# Patient Record
(demographics unavailable — no encounter records)

---

## 2019-06-27 NOTE — ULT
ULTRASOUND PELVIC

DOPPLER DUPLEX:



DATE:

6/27/2019



HISTORY:

18-year-old female with pelvic pain



TECHNIQUE:

Transabdominal transducer used to visualize intrapelvic contents with grayscale, color-flow, and spec
tral analysis.



FINDINGS:

Uterus is 5 x 2 x 3.5 cm.

Endometrial stripe 0.3 cm (3 mm)

Right ovary 1.5 x 2.5 x 2 cm.

Left ovary 2 x 2 x 1.5 cm.

No ovarian cyst.

Blood flow demonstrated in both ovaries by Doppler.

No free fluid in the cul-de-sac.



IMPRESSION:

Negative



Reported By: Juanito Murdock 

Electronically Signed:  6/27/2019 4:50 PM

## 2019-06-27 NOTE — CT
CT OF THE ABDOMEN AND PELVIS WITH CONTRAST:

6/27/19

 

COMPARISON: 

None.

 

HISTORY: 

Abdominal pain that began on Friday. The pain is most severe in the right upper quadrant of the abdom
en but has migrated to the middle and left side of the abdomen. 

 

TECHNIQUE:  

Multiple contiguous axial images were obtained in a CT of the abdomen and pelvis with contrast. PO co
ntrast was administered. Coronal reformats were performed.

 

FINDINGS: 

The liver, gallbladder, kidneys, adrenal glands, spleen, and pancreas are unremarkable.  No free air 
or stranding changes are seen in the abdomen or pelvis.  A small amount of free fluid in the pelvis i
s likely physiologic. 

 

The reproductive organs are unremarkable. The large and small bowel are unremarkable.  The appendix i
s normal. No abdominal or pelvic lymphadenopathy are seen. 

 

The osseous structures, visualized inferior thorax and abdominal wall soft tissues are unremarkable. 


 

IMPRESSION: 

No evidence of acute intra-abdominal/pelvic abnormality. 

 

POS: [facility]